# Patient Record
Sex: FEMALE | HISPANIC OR LATINO | URBAN - METROPOLITAN AREA
[De-identification: names, ages, dates, MRNs, and addresses within clinical notes are randomized per-mention and may not be internally consistent; named-entity substitution may affect disease eponyms.]

---

## 2021-01-07 ENCOUNTER — APPOINTMENT (OUTPATIENT)
Age: 41
Setting detail: DERMATOLOGY
End: 2021-01-10

## 2021-01-07 DIAGNOSIS — L259 CONTACT DERMATITIS AND OTHER ECZEMA, UNSPECIFIED CAUSE: ICD-10-CM

## 2021-01-07 DIAGNOSIS — L81.4 OTHER MELANIN HYPERPIGMENTATION: ICD-10-CM

## 2021-01-07 DIAGNOSIS — L90.5 SCAR CONDITIONS AND FIBROSIS OF SKIN: ICD-10-CM

## 2021-01-07 DIAGNOSIS — L21.8 OTHER SEBORRHEIC DERMATITIS: ICD-10-CM

## 2021-01-07 DIAGNOSIS — L85.3 XEROSIS CUTIS: ICD-10-CM

## 2021-01-07 DIAGNOSIS — L81.1 CHLOASMA: ICD-10-CM

## 2021-01-07 DIAGNOSIS — L57.8 OTHER SKIN CHANGES DUE TO CHRONIC EXPOSURE TO NONIONIZING RADIATION: ICD-10-CM

## 2021-01-07 PROBLEM — L23.9 ALLERGIC CONTACT DERMATITIS, UNSPECIFIED CAUSE: Status: ACTIVE | Noted: 2021-01-07

## 2021-01-07 PROCEDURE — OTHER IN-HOUSE DISPENSING PHARMACY: OTHER

## 2021-01-07 PROCEDURE — OTHER OTHER: OTHER

## 2021-01-07 PROCEDURE — 99203 OFFICE O/P NEW LOW 30 MIN: CPT

## 2021-01-07 PROCEDURE — OTHER MIPS QUALITY: OTHER

## 2021-01-07 PROCEDURE — OTHER TREATMENT REGIMEN: OTHER

## 2021-01-07 PROCEDURE — OTHER PRESCRIPTION: OTHER

## 2021-01-07 PROCEDURE — OTHER COUNSELING: OTHER

## 2021-01-07 RX ORDER — KETOCONAZOLE 20 MG/ML
SHAMPOO, SUSPENSION TOPICAL BIW
Qty: 1 | Refills: 3 | Status: ERX | COMMUNITY
Start: 2021-01-07

## 2021-01-07 RX ORDER — PIMECROLIMUS 10 MG/G
CREAM TOPICAL
Qty: 1 | Refills: 1 | Status: ERX | COMMUNITY
Start: 2021-01-07

## 2021-01-07 ASSESSMENT — LOCATION DETAILED DESCRIPTION DERM
LOCATION DETAILED: LEFT LATERAL SUPERIOR EYELID
LOCATION DETAILED: POSTERIOR MID-PARIETAL SCALP
LOCATION DETAILED: LEFT SUPERIOR MEDIAL UPPER BACK
LOCATION DETAILED: RIGHT INFERIOR MEDIAL FOREHEAD
LOCATION DETAILED: RIGHT LATERAL INFERIOR EYELID
LOCATION DETAILED: RIGHT SUPRATARSAL CREASE
LOCATION DETAILED: RIGHT INFERIOR CENTRAL MALAR CHEEK
LOCATION DETAILED: LEFT INFERIOR CENTRAL MALAR CHEEK
LOCATION DETAILED: LEFT ANTERIOR DISTAL THIGH
LOCATION DETAILED: RIGHT KNEE
LOCATION DETAILED: LEFT LATERAL INFERIOR EYELID

## 2021-01-07 ASSESSMENT — LOCATION ZONE DERM
LOCATION ZONE: TRUNK
LOCATION ZONE: FACE
LOCATION ZONE: SCALP
LOCATION ZONE: LEG
LOCATION ZONE: EYELID

## 2021-01-07 ASSESSMENT — LOCATION SIMPLE DESCRIPTION DERM
LOCATION SIMPLE: LEFT THIGH
LOCATION SIMPLE: POSTERIOR SCALP
LOCATION SIMPLE: LEFT INFERIOR EYELID
LOCATION SIMPLE: RIGHT SUPERIOR EYELID
LOCATION SIMPLE: RIGHT CHEEK
LOCATION SIMPLE: LEFT UPPER BACK
LOCATION SIMPLE: LEFT SUPERIOR EYELID
LOCATION SIMPLE: RIGHT KNEE
LOCATION SIMPLE: LEFT CHEEK
LOCATION SIMPLE: RIGHT FOREHEAD
LOCATION SIMPLE: RIGHT INFERIOR EYELID

## 2021-01-07 NOTE — PROCEDURE: IN-HOUSE DISPENSING PHARMACY
Product 2 Application Directions: Apply to dark spots qhs x 3-4 months on/3-4 months off as needed.\\nLot. [id number]@ Product 2 Application Directions: Apply to dark spots qhs x 3-4 months on/3-4 months off as needed.\\nLot. [id number]@
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
## 2021-01-07 NOTE — PROCEDURE: IN-HOUSE DISPENSING PHARMACY
Product 3 Application Directions: Apply to rash bid prn.  Do not use for more than 2 consecutive weeks.  Do not apply to face.\\n\\nLot. [id number]\\nExp. 9/28/2017 Product 3 Application Directions: Apply to rash bid prn.  Do not use for more than 2 consecutive weeks.  Do not apply to face.\\n\\nLot. [id number]\\nExp. 9/28/2017

## 2021-01-07 NOTE — PROCEDURE: OTHER
Note Text (......Xxx Chief Complaint.): This diagnosis correlates with the
Other (Free Text): WE DISCUSSED A FRAGRANCE FREE REGIMEN\\nSAMPLES WERE GIVEN OF VANICREAM/FREE AND CLEAR PRODUCTS\\nPT ADVISED TO D/C ALL PRODUCTS TO AFFECTED AREA\\n\\nAFTER IMPROVEMENT, PT TO REINTRODUCE PRODUCTS ONE PER WEEK\\nIF STILL UNABLE TO FIND ALLERGEN, DISCUSSED REFERRAL FOR PATCH TESTING\\n\\Fabiola WELL DISCUSSED THAT PT SHOULD RTC FOR FURTHER EVAL AND POTENTIAL BX IF RASH CONTINUES
Detail Level: Detailed
Render Risk Assessment In Note?: yes
Other (Free Text): Recommended Silicone gel\\n\\nscars are all within 3 months, s/p injuries and also s/p cyst removal on back
Other (Free Text): discussed using HQ cream on these as well\\npt to schedule consult with

## 2021-01-07 NOTE — PROCEDURE: IN-HOUSE DISPENSING PHARMACY
Product 5 Application Directions: Apply a pea size amount to clean, dry face at bedtime.\\nLot. [id number] Product 5 Application Directions: Apply a pea size amount to clean, dry face at bedtime.\\nLot. [id number]

## 2021-02-22 ENCOUNTER — APPOINTMENT (OUTPATIENT)
Age: 41
Setting detail: DERMATOLOGY
End: 2021-02-22

## 2021-02-22 DIAGNOSIS — Z41.9 ENCOUNTER FOR PROCEDURE FOR PURPOSES OTHER THAN REMEDYING HEALTH STATE, UNSPECIFIED: ICD-10-CM

## 2021-02-22 PROCEDURE — OTHER MEDICAL CONSULTATION: FILLERS: OTHER

## 2021-02-22 PROCEDURE — OTHER PRODUCT LINE (ANTI-AGING): OTHER

## 2021-02-22 PROCEDURE — OTHER COSMETIC CONSULTATION: CHEMICAL PEELS: OTHER

## 2021-02-22 ASSESSMENT — LOCATION DETAILED DESCRIPTION DERM
LOCATION DETAILED: LEFT CHIN
LOCATION DETAILED: LEFT FOREHEAD
LOCATION DETAILED: RIGHT INFERIOR CENTRAL MALAR CHEEK
LOCATION DETAILED: RIGHT INFERIOR MEDIAL MALAR CHEEK
LOCATION DETAILED: NASAL SUPRATIP
LOCATION DETAILED: RIGHT NASAL SIDEWALL
LOCATION DETAILED: LEFT NASAL SIDEWALL
LOCATION DETAILED: LEFT INFERIOR MEDIAL MALAR CHEEK
LOCATION DETAILED: LEFT INFERIOR CENTRAL MALAR CHEEK
LOCATION DETAILED: RIGHT FOREHEAD
LOCATION DETAILED: GLABELLA

## 2021-02-22 ASSESSMENT — LOCATION SIMPLE DESCRIPTION DERM
LOCATION SIMPLE: RIGHT CHEEK
LOCATION SIMPLE: CHIN
LOCATION SIMPLE: LEFT CHEEK
LOCATION SIMPLE: RIGHT FOREHEAD
LOCATION SIMPLE: LEFT CHEEK
LOCATION SIMPLE: LEFT NOSE
LOCATION SIMPLE: NOSE
LOCATION SIMPLE: GLABELLA
LOCATION SIMPLE: RIGHT NOSE
LOCATION SIMPLE: RIGHT CHEEK
LOCATION SIMPLE: LEFT FOREHEAD

## 2021-02-22 ASSESSMENT — LOCATION ZONE DERM
LOCATION ZONE: FACE
LOCATION ZONE: NOSE
LOCATION ZONE: FACE
LOCATION ZONE: NOSE

## 2021-02-22 NOTE — PROCEDURE: PRODUCT LINE (ANTI-AGING)
Risk Of Complication Category: Low (OTC Medications)
Product 16 Price (In Dollars - Numeric Only, No Special Characters Or $): 116.00
Product 21 Price (In Dollars - Numeric Only, No Special Characters Or $): 200.00
Product 4 Application Directions: Use as directed at night
Name Of Product 27: Elta MD UV clear Spf 46
Product 25 Application Directions: Apply at night after cleansing, twice a week. Leave on for 20-25 minutes then rinse.
Product 9 Units: 0
Product 21 Application Directions: Apply on clean face morning and night
Product 19 Price (In Dollars - Numeric Only, No Special Characters Or $): 72.00
Product 36 Application Directions: Use at night, as directed
Allow Plan To Count Towards E/M Coding: Yes
Product 15 Application Directions: Apply daily in morning, reapply throughout the day.
Product 6 Price (In Dollars - Numeric Only, No Special Characters Or $): 158.00
Product 8 Price (In Dollars - Numeric Only, No Special Characters Or $): 54.00
Product 11 Application Directions: Use as directed around the eyes at night.
Name Of Product 41: Elta MD sport Spf 50
Product 38 Application Directions: Apply to clean skin in the AM as directed
Name Of Product 22: Elta MD 30 Sport
Name Of Product 31: Elta MD lip balm
Product 34 Application Directions: Use as directed
Product 5 Price (In Dollars - Numeric Only, No Special Characters Or $): 25.00
Product 66 Price (In Dollars - Numeric Only, No Special Characters Or $): 0.00
Product 37 Application Directions: Use in shower morning and after a workout.
Product 22 Price (In Dollars - Numeric Only, No Special Characters Or $): 53.00
Product 40 Application Directions: Use as directed each morning
Product 19 Units: 1
Product 47 Price (In Dollars - Numeric Only, No Special Characters Or $): 179.00
Product 45 Price (In Dollars - Numeric Only, No Special Characters Or $): 33.00
Product 32 Price (In Dollars - Numeric Only, No Special Characters Or $): 38.00
Name Of Product 35: Elta MD PM Therapy
Product 6 Application Directions: Applying after cleansing in the evening
Product 44 Application Directions: Use as directed on clean dry skin morning and night
Product 12 Application Directions: Cleanse 2-3 times per week in the morning.
Product 17 Application Directions: Cleanse with 1 pump morning and night
Name Of Product 32: Elta MD SPF 30 Lotion
Name Of Product 44: CRISSY Mcgowan Serum
Product 35 Price (In Dollars - Numeric Only, No Special Characters Or $): 36.00
Product 30 Price (In Dollars - Numeric Only, No Special Characters Or $): 16.00
Name Of Product 2: ZO Vitamin C
Name Of Product 21: Allastin
Product 1 Price (In Dollars - Numeric Only, No Special Characters Or $): 164.00
Product 14 Application Directions: Apply at night to clean skin.
Assigning Risk Information: Per AMA, level of risk is based upon consequences of the problem(s) addressed at the encounter when appropriately treated. Risk also includes medical decision making related to the need to initiate or forego further testing, treatment and/or hospitalization. Over the counter medication are assigned a risk level of low. Prescription medication management is assigned a risk level of moderate.
Name Of Product 17: Elta MD Gentle Cleanser
Name Of Product 18: Complexion Renewal Pads
Name Of Product 15: Elta MD SPF 40 Daily UV Tinted
Name Of Product 16: ZO Renweal Cream
Name Of Product 39: ZO Exfoliating Polish
Name Of Product 40: ZO Growth Factor Serum
Name Of Product 36: ZO Retinol Repair
Name Of Product 14: Skin Medica TNS Serum
Product 8 Application Directions: Use morning and night
Product 26 Price (In Dollars - Numeric Only, No Special Characters Or $): 55.00
Product 9 Price (In Dollars - Numeric Only, No Special Characters Or $): 77.00
Product 24 Application Directions: Apply after cleansing morning and night.
Product 40 Price (In Dollars - Numeric Only, No Special Characters Or $): 162.00
Product 23 Price (In Dollars - Numeric Only, No Special Characters Or $): 70.00
Name Of Product 46: Elta MD Aero spf 45
Product 37 Price (In Dollars - Numeric Only, No Special Characters Or $): 14.00
Product 10 Application Directions: Cleanse Morning and Night as directed
Product 16 Application Directions: Apply at night after cleaning face and treatment creams.
Name Of Product 43: Hydrating Cream
Name Of Product 4: Recovery Night Repair
Product 2 Price (In Dollars - Numeric Only, No Special Characters Or $): 102.00
Product 31 Price (In Dollars - Numeric Only, No Special Characters Or $): 12.00
Product 14 Price (In Dollars - Numeric Only, No Special Characters Or $): 187.00
Name Of Product 42: Elta MD spf 47 pure
Name Of Product 1: ZO Daily Power Defense
Name Of Product 34: Nutrofol Supplement
Product 39 Price (In Dollars - Numeric Only, No Special Characters Or $): 73.00
Product 29 Price (In Dollars - Numeric Only, No Special Characters Or $): 50.00
Product 43 Application Directions: Use at night as directed
Name Of Product 5: Bryce LEVINE Foaming Cleanser
Product 36 Price (In Dollars - Numeric Only, No Special Characters Or $): 114.00
Name Of Product 28: Elta MD Moisturizer
Name Of Product 3: ZO Hydra Firm
Name Of Product 47: Pati
Name Of Product 11: ZO Intensive Eye Repair
Product 34 Price (In Dollars - Numeric Only, No Special Characters Or $): 88.00
Product 9 Application Directions: Use daily in the morning
Product 23 Application Directions: Use morning and night on hands and chest. At night to layer with Tretinoin and light moistuizer if necessary.
Name Of Product 9: ZO Smart Tone
Product 5 Application Directions: Morning and Night
Product 11 Price (In Dollars - Numeric Only, No Special Characters Or $): 153.00
Name Of Product 19: ZO Pigment Control
Product 28 Application Directions: Apply as directed by provider
Product 28 Price (In Dollars - Numeric Only, No Special Characters Or $): 13.00
Name Of Product 30: Elta MD Enzyme Gel
Product 44 Price (In Dollars - Numeric Only, No Special Characters Or $): 235.00
Product 13 Price (In Dollars - Numeric Only, No Special Characters Or $): $167.00
Name Of Product 45: Elta MD Daily UV
Name Of Product 23: Pigment Control w/
Detail Level: Zone
Name Of Product 38: Elta MD AM Therapy
Name Of Product 33: Glycogent
Product 13 Application Directions: Apply to clean face at night.
Product 19 Application Directions: Apply 1 pump to clean skin daily as instructed
Name Of Product 29: Elta MD Barrier cream
Product 22 Application Directions: Apply 20 minutes before sun exposure
Name Of Product 10: ZO Gentle Skin Cleanser
Product 26 Application Directions: Apply to healed scar twice daily.
Product 42 Price (In Dollars - Numeric Only, No Special Characters Or $): 32.00
Name Of Product 26: Silagen Scar Gel with SPF
Product 27 Application Directions: Apply in the morning as part of daily routine.
Product 18 Application Directions: Apply directly after cleansing morning and night
Product 45 Application Directions: Apply daily as directed
Name Of Product 37: Benzolyl Peroxide Wash
Name Of Product 20: Elta MD UV Physical SPF 41
Product 25 Price (In Dollars - Numeric Only, No Special Characters Or $): 42.00
Name Of Product 8: ZO Hydrating cleanser
Name Of Product 6: ZO Wrinkle and Texture Repair
Product 3 Application Directions: Apply 1/2 a pea size around eyes morning and night after cleansing face.
Product 2 Application Directions: Daytime~ after face cleanse before applying SPF
Name Of Product 7: ZO Recovery cream
Name Of Product 13: ZO Radical Night Repair.
Name Of Product 25: ZO Sulfur Mask
Product 3 Price (In Dollars - Numeric Only, No Special Characters Or $): $153.00
Product 1 Application Directions: Per directions, in the morning